# Patient Record
Sex: FEMALE | Race: AMERICAN INDIAN OR ALASKA NATIVE | NOT HISPANIC OR LATINO | Employment: STUDENT | URBAN - METROPOLITAN AREA
[De-identification: names, ages, dates, MRNs, and addresses within clinical notes are randomized per-mention and may not be internally consistent; named-entity substitution may affect disease eponyms.]

---
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
## 2025-05-26 NOTE — ED NOTES
MD at .     
Pt ambulated to room, given gown to change into, call light in reach, informed of nothing to eat or drink until the doctor states otherwise.      
Suzette Bautista D/C'rudy.  Discharge instructions including the importance of hydration, the use of OTC medications, informations on  and the proper follow up recommendations have been provided to the patient/family. New medication, albuterol and motrin reviewed with mother and pt.  Return precautions given. Questions answered. Verbalized understanding. Pt walked out of ER with family. Pt in NAD, alert and acting age appropriate.     
Spontaneous, unlabored and symmetrical